# Patient Record
Sex: MALE | Race: WHITE | NOT HISPANIC OR LATINO | Employment: FULL TIME | URBAN - METROPOLITAN AREA
[De-identification: names, ages, dates, MRNs, and addresses within clinical notes are randomized per-mention and may not be internally consistent; named-entity substitution may affect disease eponyms.]

---

## 2023-01-24 PROBLEM — I10 HTN (HYPERTENSION): Status: ACTIVE | Noted: 2023-01-24

## 2023-01-24 PROBLEM — G56.03 BILATERAL CARPAL TUNNEL SYNDROME: Status: ACTIVE | Noted: 2023-01-24

## 2023-01-24 PROBLEM — J30.2 SEASONAL ALLERGIES: Status: ACTIVE | Noted: 2023-01-24

## 2023-01-24 PROBLEM — D22.5 NEVUS OF ABDOMINAL WALL: Status: ACTIVE | Noted: 2023-01-24

## 2023-01-24 PROBLEM — R79.89 ELEVATED LIVER FUNCTION TESTS: Status: ACTIVE | Noted: 2023-01-24

## 2023-01-24 PROBLEM — F19.99: Status: ACTIVE | Noted: 2023-01-24

## 2023-01-24 PROBLEM — F32.1 DEPRESSION, MAJOR, SINGLE EPISODE, MODERATE (MULTI): Status: ACTIVE | Noted: 2023-01-24

## 2023-01-24 PROBLEM — M75.81 TENDINITIS OF RIGHT ROTATOR CUFF: Status: ACTIVE | Noted: 2023-01-24

## 2023-01-24 PROBLEM — R73.03 PREDIABETES: Status: ACTIVE | Noted: 2023-01-24

## 2023-01-24 RX ORDER — LORATADINE 10 MG/1
1 TABLET ORAL DAILY
COMMUNITY
Start: 2021-11-15

## 2023-01-24 RX ORDER — ASPIRIN 81 MG/1
1 TABLET ORAL DAILY
COMMUNITY
Start: 2022-07-06

## 2023-04-06 ENCOUNTER — OFFICE VISIT (OUTPATIENT)
Dept: PRIMARY CARE | Facility: CLINIC | Age: 38
End: 2023-04-06
Payer: MEDICAID

## 2023-04-06 VITALS
DIASTOLIC BLOOD PRESSURE: 85 MMHG | BODY MASS INDEX: 32.89 KG/M2 | HEIGHT: 68 IN | TEMPERATURE: 98.4 F | HEART RATE: 58 BPM | WEIGHT: 217 LBS | SYSTOLIC BLOOD PRESSURE: 160 MMHG | RESPIRATION RATE: 16 BRPM

## 2023-04-06 DIAGNOSIS — Z00.00 ROUTINE GENERAL MEDICAL EXAMINATION AT A HEALTH CARE FACILITY: Primary | ICD-10-CM

## 2023-04-06 DIAGNOSIS — R73.03 PREDIABETES: ICD-10-CM

## 2023-04-06 DIAGNOSIS — I10 PRIMARY HYPERTENSION: ICD-10-CM

## 2023-04-06 PROBLEM — F19.99: Status: RESOLVED | Noted: 2023-01-24 | Resolved: 2023-04-06

## 2023-04-06 PROBLEM — R79.89 ELEVATED LIVER FUNCTION TESTS: Status: RESOLVED | Noted: 2023-01-24 | Resolved: 2023-04-06

## 2023-04-06 PROCEDURE — 3079F DIAST BP 80-89 MM HG: CPT | Performed by: FAMILY MEDICINE

## 2023-04-06 PROCEDURE — 3077F SYST BP >= 140 MM HG: CPT | Performed by: FAMILY MEDICINE

## 2023-04-06 PROCEDURE — 99396 PREV VISIT EST AGE 40-64: CPT | Performed by: FAMILY MEDICINE

## 2023-04-06 PROCEDURE — 1036F TOBACCO NON-USER: CPT | Performed by: FAMILY MEDICINE

## 2023-04-06 NOTE — PROGRESS NOTES
Subjective   Ranjit Alcazar is a 38 y.o. male who presents for a wellness visit.  HPI    Review of Systems  Visit Vitals  /85   Pulse 58   Temp 36.9 °C (98.4 °F)   Resp 16      Objective   Physical Exam  Constitutional:       Appearance: Normal appearance.   HENT:      Head: Normocephalic.   Eyes:      Conjunctiva/sclera: Conjunctivae normal.   Cardiovascular:      Rate and Rhythm: Normal rate and regular rhythm.   Pulmonary:      Effort: Pulmonary effort is normal.      Breath sounds: Normal breath sounds.   Musculoskeletal:      Cervical back: Neck supple.   Skin:     General: Skin is warm and dry.   Neurological:      Mental Status: He is alert.            Assessment/Plan    Ranjit was seen today for annual exam.  Diagnoses and all orders for this visit:  Routine general medical examination at a health care facility  -     CBC; Future  -     Comprehensive Metabolic Panel; Future  -     Lipid Panel; Future  -     TSH with reflex to Free T4 if abnormal; Future  -     Hemoglobin A1C; Future  -     Hepatitis C Antibody; Future  -     Follow Up In [facility] - PCP; Future  Prediabetes  -     Hemoglobin A1C; Future  Primary hypertension  -     CBC; Future  -     Comprehensive Metabolic Panel; Future  -     TSH with reflex to Free T4 if abnormal; Future       Primary hypertension  He stopped meds before and had normal BP but is high today.  We will get interval readings, check labs, and follow up in 2 weeks to repeat BP

## 2023-04-06 NOTE — ASSESSMENT & PLAN NOTE
He stopped meds before and had normal BP but is high today.  We will get interval readings, check labs, and follow up in 2 weeks to repeat BP

## 2023-05-11 ENCOUNTER — APPOINTMENT (OUTPATIENT)
Dept: PRIMARY CARE | Facility: CLINIC | Age: 38
End: 2023-05-11
Payer: MEDICAID

## 2023-05-18 ENCOUNTER — APPOINTMENT (OUTPATIENT)
Dept: PRIMARY CARE | Facility: CLINIC | Age: 38
End: 2023-05-18
Payer: MEDICAID

## 2023-05-25 ENCOUNTER — OFFICE VISIT (OUTPATIENT)
Dept: PRIMARY CARE | Facility: CLINIC | Age: 38
End: 2023-05-25
Payer: MEDICAID

## 2023-05-25 VITALS
TEMPERATURE: 98.4 F | DIASTOLIC BLOOD PRESSURE: 88 MMHG | RESPIRATION RATE: 16 BRPM | HEIGHT: 68 IN | WEIGHT: 213 LBS | SYSTOLIC BLOOD PRESSURE: 132 MMHG | BODY MASS INDEX: 32.28 KG/M2 | HEART RATE: 74 BPM

## 2023-05-25 DIAGNOSIS — E78.49 OTHER HYPERLIPIDEMIA: ICD-10-CM

## 2023-05-25 DIAGNOSIS — I10 PRIMARY HYPERTENSION: Primary | ICD-10-CM

## 2023-05-25 PROCEDURE — 99442 PR PHYS/QHP TELEPHONE EVALUATION 11-20 MIN: CPT | Performed by: FAMILY MEDICINE

## 2023-05-25 PROCEDURE — 3079F DIAST BP 80-89 MM HG: CPT | Performed by: FAMILY MEDICINE

## 2023-05-25 PROCEDURE — 1036F TOBACCO NON-USER: CPT | Performed by: FAMILY MEDICINE

## 2023-05-25 PROCEDURE — 3075F SYST BP GE 130 - 139MM HG: CPT | Performed by: FAMILY MEDICINE

## 2023-05-25 ASSESSMENT — ENCOUNTER SYMPTOMS
HYPERTENSION: 1
SHORTNESS OF BREATH: 0
BLURRED VISION: 0
HEADACHES: 0
PALPITATIONS: 0

## 2023-05-25 NOTE — ASSESSMENT & PLAN NOTE
Since her last visit he was not able to get a blood pressure cuff so does not have interval readings.  His blood pressure is borderline today with a diastolic of 88 but previous visit showed significantly elevated blood pressure.  His lab work was all normal without any kidney or thyroid disease contributing to blood pressure.  I wrote an order today for a blood pressure monitor for him to  at the pharmacy.  I advised him to get daily blood pressure readings for the next 2 weeks and then send them to me via the [employer] so we can make a decision as to whether he needs blood pressure treatment or not.

## 2023-05-25 NOTE — PROGRESS NOTES
Subjective   Ranjit Alcazar is a 38 y.o. male who presents for Hypertension.  Hypertension  This is a new problem. Pertinent negatives include no blurred vision, chest pain, headaches, palpitations, peripheral edema or shortness of breath. Past treatments include nothing. There are no compliance problems.      Visit Vitals  /88   Pulse 74   Temp 36.9 °C (98.4 °F)   Resp 16      Objective   Physical Exam    Assessment/Plan    Problem List Items Addressed This Visit       Primary hypertension - Primary     Since her last visit he was not able to get a blood pressure cuff so does not have interval readings.  His blood pressure is borderline today with a diastolic of 88 but previous visit showed significantly elevated blood pressure.  His lab work was all normal without any kidney or thyroid disease contributing to blood pressure.  I wrote an order today for a blood pressure monitor for him to  at the pharmacy.  I advised him to get daily blood pressure readings for the next 2 weeks and then send them to me via the [employer] so we can make a decision as to whether he needs blood pressure treatment or not.         Relevant Orders    Blood pressure monitor    Other hyperlipidemia     Lipid panel shows an elevated total cholesterol and LDL but the ASCVD risk score is only 2.6%.  I advised that medication is not currently indicated but it would be in his best interest to start following a low-fat low-cholesterol diet focus more on whole grains, vegetables, and fish oils.  We will continue to monitor this closely and recalculate his risk score           The patient was examined by Dr. Tran, a sports medicine fellow who is rotating with me currently.  The patient was unable to stay after the visit to have a personal evaluation by me so I conducted my visit via telephone call.  This visit will be converted to a phone visit only.  In total I spent about 15 minutes on the phone and in chart review with this  patient.

## 2023-05-25 NOTE — ASSESSMENT & PLAN NOTE
Lipid panel shows an elevated total cholesterol and LDL but the ASCVD risk score is only 2.6%.  I advised that medication is not currently indicated but it would be in his best interest to start following a low-fat low-cholesterol diet focus more on whole grains, vegetables, and fish oils.  We will continue to monitor this closely and recalculate his risk score

## 2023-12-06 ENCOUNTER — OFFICE VISIT (OUTPATIENT)
Dept: PRIMARY CARE | Facility: CLINIC | Age: 38
End: 2023-12-06
Payer: MEDICAID

## 2023-12-06 VITALS
BODY MASS INDEX: 32.69 KG/M2 | SYSTOLIC BLOOD PRESSURE: 154 MMHG | WEIGHT: 215 LBS | RESPIRATION RATE: 16 BRPM | TEMPERATURE: 98 F | HEART RATE: 72 BPM | DIASTOLIC BLOOD PRESSURE: 100 MMHG

## 2023-12-06 DIAGNOSIS — J01.90 ACUTE NON-RECURRENT SINUSITIS, UNSPECIFIED LOCATION: Primary | ICD-10-CM

## 2023-12-06 DIAGNOSIS — R21 RASH: ICD-10-CM

## 2023-12-06 PROCEDURE — 3080F DIAST BP >= 90 MM HG: CPT | Performed by: NURSE PRACTITIONER

## 2023-12-06 PROCEDURE — 99213 OFFICE O/P EST LOW 20 MIN: CPT | Performed by: NURSE PRACTITIONER

## 2023-12-06 PROCEDURE — 1036F TOBACCO NON-USER: CPT | Performed by: NURSE PRACTITIONER

## 2023-12-06 PROCEDURE — 3077F SYST BP >= 140 MM HG: CPT | Performed by: NURSE PRACTITIONER

## 2023-12-06 ASSESSMENT — ENCOUNTER SYMPTOMS
SLEEP DISTURBANCE: 0
CHILLS: 0
RHINORRHEA: 0
DIARRHEA: 0
SHORTNESS OF BREATH: 1
SINUS PRESSURE: 0
ACTIVITY CHANGE: 0
HEADACHES: 0
APPETITE CHANGE: 0
COUGH: 1
SINUS PAIN: 0
FATIGUE: 0
FEVER: 0
SORE THROAT: 0
VOMITING: 0

## 2023-12-07 RX ORDER — AMOXICILLIN AND CLAVULANATE POTASSIUM 875; 125 MG/1; MG/1
875 TABLET, FILM COATED ORAL 2 TIMES DAILY
Qty: 20 TABLET | Refills: 0 | Status: SHIPPED | OUTPATIENT
Start: 2023-12-07 | End: 2023-12-17

## 2023-12-07 NOTE — PROGRESS NOTES
Subjective   Patient ID: Ranjit Alcazar is a 38 y.o. male who presents for Rash.    Cold symptoms x2 weeks  Nasal congestion  Cough  No fever or chills  No GI issues  No sore throat  No ear pain  Denies any sinus pain pressure    Daughter is sick    Rash-   Noticed   Bumps on bilateral hands and feet  Pt states they do not itch  No pain    Rash-   On bilateral hands and feet  Denies any pain and itching  Has not tried anything  Several areas scabbed over    OTC- dayquil/ nyquil/ mucinex Dm/ afrin (used this morning    Rash  This is a new problem. The current episode started in the past 7 days. The problem is unchanged. The affected locations include the left hand, right hand, right foot, left foot, right lower leg and left lower leg. The rash is characterized by redness and dryness. He was exposed to nothing. Associated symptoms include congestion, coughing and shortness of breath. Pertinent negatives include no diarrhea, fatigue, fever, rhinorrhea, sore throat or vomiting. Treatments tried: Mucinex. The treatment provided no relief.        Review of Systems   Constitutional:  Negative for activity change, appetite change, chills, fatigue and fever.   HENT:  Positive for congestion. Negative for ear pain, rhinorrhea, sinus pressure, sinus pain and sore throat.    Respiratory:  Positive for cough and shortness of breath.    Gastrointestinal:  Negative for diarrhea and vomiting.   Skin:  Positive for rash.   Neurological:  Negative for headaches.   Psychiatric/Behavioral:  Negative for sleep disturbance.        Objective   BP (!) 154/100 (BP Location: Right arm, Patient Position: Sitting, BP Cuff Size: Adult)   Pulse 72   Temp 36.7 °C (98 °F) (Temporal)   Resp 16   Wt 97.5 kg (215 lb)   BMI 32.69 kg/m²     Physical Exam  Vitals reviewed.   Constitutional:       Appearance: Normal appearance.   HENT:      Head: Normocephalic.      Right Ear: Tympanic membrane, ear canal and external ear normal.      Left Ear:  Tympanic membrane, ear canal and external ear normal.      Nose: Mucosal edema and congestion present.      Right Turbinates: Swollen.      Left Turbinates: Swollen.      Mouth/Throat:      Lips: Pink.      Mouth: Mucous membranes are moist.      Pharynx: Posterior oropharyngeal erythema present.   Eyes:      Extraocular Movements: Extraocular movements intact.      Conjunctiva/sclera: Conjunctivae normal.      Pupils: Pupils are equal, round, and reactive to light.   Cardiovascular:      Rate and Rhythm: Normal rate and regular rhythm.      Pulses: Normal pulses.      Heart sounds: Normal heart sounds.   Pulmonary:      Effort: Pulmonary effort is normal.      Breath sounds: Normal breath sounds.   Musculoskeletal:      Cervical back: Normal range of motion.   Skin:     General: Skin is warm.      Capillary Refill: Capillary refill takes less than 2 seconds.      Findings: Rash present.      Comments: Several lesions on top of hand and tops of feet that have scabbed over. Mild petechiae noted on top of foot.    Neurological:      General: No focal deficit present.      Mental Status: He is alert and oriented to person, place, and time.   Psychiatric:         Mood and Affect: Mood normal.         Behavior: Behavior normal.         Assessment/Plan   Problem List Items Addressed This Visit             ICD-10-CM    Rash R21     Advised to take Zyrtec daily  Should wash the area 2x/day with anti-bacterial soap such as dial  Will be on antibiotic for sinusitis  Follow up with PCP if not improving over the next 2-3 days  ER of any fevers or worsening of symptoms           Acute non-recurrent sinusitis - Primary J01.90     Antibiotics given for acute sinusitis; Take full course until completed  Encouraged daily use of Flonase and Zyrtec for symptom support  Follow up with PCP if not improving over the next 3-4 days  ER for any SOB, difficulty breathing, uncontrolled fevers or worsening of symptoms           Relevant  Medications    amoxicillin-pot clavulanate (Augmentin) 875-125 mg tablet

## 2023-12-08 PROBLEM — J01.90 ACUTE NON-RECURRENT SINUSITIS: Status: ACTIVE | Noted: 2023-12-08

## 2023-12-08 PROBLEM — R21 RASH: Status: ACTIVE | Noted: 2023-12-08

## 2023-12-08 NOTE — ASSESSMENT & PLAN NOTE
Antibiotics given for acute sinusitis; Take full course until completed  Encouraged daily use of Flonase and Zyrtec for symptom support  Follow up with PCP if not improving over the next 3-4 days  ER for any SOB, difficulty breathing, uncontrolled fevers or worsening of symptoms

## 2023-12-08 NOTE — ASSESSMENT & PLAN NOTE
Advised to take Zyrtec daily  Should wash the area 2x/day with anti-bacterial soap such as dial  Will be on antibiotic for sinusitis  Follow up with PCP if not improving over the next 2-3 days  ER of any fevers or worsening of symptoms

## 2024-02-19 ENCOUNTER — OFFICE VISIT (OUTPATIENT)
Dept: PRIMARY CARE | Facility: CLINIC | Age: 39
End: 2024-02-19
Payer: MEDICAID

## 2024-02-19 VITALS
DIASTOLIC BLOOD PRESSURE: 107 MMHG | WEIGHT: 227 LBS | SYSTOLIC BLOOD PRESSURE: 160 MMHG | BODY MASS INDEX: 34.4 KG/M2 | TEMPERATURE: 97.9 F | HEIGHT: 68 IN | RESPIRATION RATE: 14 BRPM | HEART RATE: 72 BPM

## 2024-02-19 DIAGNOSIS — R73.03 PREDIABETES: ICD-10-CM

## 2024-02-19 DIAGNOSIS — I10 HTN (HYPERTENSION), BENIGN: Primary | ICD-10-CM

## 2024-02-19 DIAGNOSIS — Z00.00 ROUTINE GENERAL MEDICAL EXAMINATION AT A HEALTH CARE FACILITY: ICD-10-CM

## 2024-02-19 PROBLEM — R21 RASH: Status: RESOLVED | Noted: 2023-12-08 | Resolved: 2024-02-19

## 2024-02-19 PROBLEM — E78.49 OTHER HYPERLIPIDEMIA: Status: RESOLVED | Noted: 2023-05-25 | Resolved: 2024-02-19

## 2024-02-19 PROBLEM — J01.90 ACUTE NON-RECURRENT SINUSITIS: Status: RESOLVED | Noted: 2023-12-08 | Resolved: 2024-02-19

## 2024-02-19 LAB — POC HEMOGLOBIN A1C: 6 % (ref 4.2–6.5)

## 2024-02-19 PROCEDURE — 83036 HEMOGLOBIN GLYCOSYLATED A1C: CPT | Performed by: FAMILY MEDICINE

## 2024-02-19 PROCEDURE — 3080F DIAST BP >= 90 MM HG: CPT | Performed by: FAMILY MEDICINE

## 2024-02-19 PROCEDURE — 1036F TOBACCO NON-USER: CPT | Performed by: FAMILY MEDICINE

## 2024-02-19 PROCEDURE — 99396 PREV VISIT EST AGE 40-64: CPT | Performed by: FAMILY MEDICINE

## 2024-02-19 PROCEDURE — 3077F SYST BP >= 140 MM HG: CPT | Performed by: FAMILY MEDICINE

## 2024-02-19 RX ORDER — CHLORTHALIDONE 25 MG/1
25 TABLET ORAL DAILY
Qty: 30 TABLET | Refills: 0 | Status: SHIPPED | OUTPATIENT
Start: 2024-02-19 | End: 2024-03-18 | Stop reason: SDUPTHER

## 2024-02-19 NOTE — PROGRESS NOTES
Subjective   Ranjit Alcazar is a 39 y.o. male who presents for Annual Exam.  HPI         Visit Vitals  BP (!) 160/107   Pulse 72   Temp 36.6 °C (97.9 °F)   Resp 14      Objective   Physical Exam  Constitutional:       Appearance: Normal appearance.   HENT:      Head: Normocephalic.   Eyes:      Conjunctiva/sclera: Conjunctivae normal.   Cardiovascular:      Rate and Rhythm: Normal rate and regular rhythm.   Pulmonary:      Effort: Pulmonary effort is normal.      Breath sounds: Normal breath sounds.   Musculoskeletal:      Cervical back: Neck supple.   Skin:     General: Skin is warm and dry.   Neurological:      Mental Status: He is alert.       Assessment/Plan      Problem List Items Addressed This Visit       HTN (hypertension), benign - Primary     Unfortunately blood pressure continues to run high and on repeat is about the same.  Interval readings have also been high.  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  We discussed the importance of treatment and we will start with a thiazide diuretic as he is also complaining of swelling in the hands.  He will follow-up in 2 to 3 months for blood pressure recheck         Relevant Medications    chlorthalidone (Hygroton) 25 mg tablet    Other Relevant Orders    Lipid Panel    Comprehensive Metabolic Panel    CBC    Follow Up In [facility] - PCP - Established    Prediabetes     Lab Results   Component Value Date    HGBA1C 6.0 02/19/2024   A1c continues to remain slightly elevated but below the diagnostic criteria for diabetes.  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  On examination there is no spongy arthropathy or deviation or erythema.  I think this is probably inflammation from overuse.  It is quite possible the thiazide diuretic will actually help with the swelling.  I also recommended taking a dose of ibuprofen after a day of heavy use of the finger joints.  This may work to prevent the swelling the next day.    We reviewed family history and screening and he is pretty much up-to-date on testing.  He is not yet due for colonoscopy or prostate screening but will get annual blood screening before his next visit.

## 2024-02-19 NOTE — ASSESSMENT & PLAN NOTE
Lab Results   Component Value Date    HGBA1C 6.0 02/19/2024     A1c continues to remain slightly elevated but below the diagnostic criteria for diabetes.  We will continue to follow this at least annually, and advised healthy lower carbohydrate meals

## 2024-02-19 NOTE — ASSESSMENT & PLAN NOTE
Unfortunately blood pressure continues to run high and on repeat is about the same.  Interval readings have also been high.  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  We discussed the importance of treatment and we will start with a thiazide diuretic as he is also complaining of swelling in the hands.  He will follow-up in 2 to 3 months for blood pressure recheck

## 2024-03-18 DIAGNOSIS — I10 HTN (HYPERTENSION), BENIGN: ICD-10-CM

## 2024-03-18 RX ORDER — CHLORTHALIDONE 25 MG/1
25 TABLET ORAL DAILY
Qty: 90 TABLET | Refills: 3 | Status: SHIPPED | OUTPATIENT
Start: 2024-03-18

## 2024-06-03 ENCOUNTER — APPOINTMENT (OUTPATIENT)
Dept: PRIMARY CARE | Facility: CLINIC | Age: 39
End: 2024-06-03
Payer: MEDICAID